# Patient Record
(demographics unavailable — no encounter records)

---

## 2024-11-11 NOTE — PROCEDURE
[FreeTextEntry1] :  ACC: [id number] EXAM: CT ANGIO CHEST AORTA [facility] ACC: [id number] EXAM: CT ANGIO ABD PELV (W)AW IC  PROCEDURE DATE: 01/24/2022    INTERPRETATION: CLINICAL INFORMATION: Atraumatic right-sided back pain, evaluate for aortic dissection  COMPARISON: Chest radiograph from earlier the same day  CONTRAST/COMPLICATIONS: IV Contrast: Omnipaque 350 120 cc administered 80 cc discarded Oral Contrast: NONE Complications: None reported at time of study completion  PROCEDURE: CT Angiography of the Chest, Abdomen and Pelvis. Gated precontrast imaging was performed through the heart followed by gated CT Angiography of the heart with subsequent non-gated arterial phase imaging of the chest, abdomen and pelvis. Sagittal and coronal reformats were performed as well as 3D (MIP) reconstructions.  FINDINGS: CHEST: LUNGS AND LARGE AIRWAYS: Patent central airways. 5 mm right lower lobe pulmonary nodule (). Emphysema. Cystic areas within the periphery of the lingula and left lower lobe may represent traction bronchiectasis (). PLEURA: Bilateral pleural calcifications. VESSELS: No intramural hematoma or aortic dissection. Atherosclerotic calcification of the aorta and coronary arteries with extensive ulcerated plaque in the aortic arch and descending thoracic aorta. Incidental note is made of a bovine arch, a normal variant. HEART: Heart size is normal. No pericardial effusion. MEDIASTINUM AND MORENO: No lymphadenopathy. CHEST WALL AND LOWER NECK: Left chest wall AICD.  ABDOMEN AND PELVIS: LIVER: Within normal limits. BILE DUCTS: Normal caliber. GALLBLADDER: Cholecystectomy. SPLEEN: Within normal limits. PANCREAS: Within normal limits. ADRENALS: Within normal limits. KIDNEYS/URETERS: Within normal limits.  BLADDER: Within normal limits. REPRODUCTIVE ORGANS: Prostate within normal limits.  BOWEL: No bowel obstruction. Appendix is normal. PERITONEUM: No ascites. VESSELS: No aortic dissection. The celiac axis, SMA, YULI and bilateral renal arteries are patent. Infrarenal abdominal aortic aneurysm measures up to 4.0 cm (11-65). There is narrowing at the origin of the celiac axis. RETROPERITONEUM/LYMPH NODES: No lymphadenopathy. ABDOMINAL WALL: Within normal limits. BONES: Degenerative changes. Osteopenia  IMPRESSION: * No thoracic intramural hematoma. * No aortic dissection. * 4.0 cm infrarenal abdominal aortic aneurysm. Emphysema. AICD Calcified pleural and diaphragmatic plaques assistant with asbestos exposure Extensive ulcerated plaque involving the aortic arch and descending thoracic aorta and upper abdominal aorta  --- End of Report ---     ARCADIO BABIN MD; Resident Radiology This document has been electronically signed. SALVADOR PAYNE MD; Attending Radiologist This document has been electronically signed. Jan 24 2022 7:07PM

## 2024-11-11 NOTE — DISCUSSION/SUMMARY
[FreeTextEntry1] : COPD:  using  Anoro at night, with benefit   history of  CAD ischemic cardiomyopathy  and HTN he has had COVID vaccine  Mitral stenosis, Severe LVH. Moderately dilated left atrium. Mild diastolic dysfunction.  CKD  CT chest 01/24//22 in hospital demonstrated RLL 5 mm nodule, also with pleural plaques consistent with asbestos pleural disease  He has had LLL nodule  history of AAA,  history of carotid artery stenosis  No infection, no CHF or angina  He is following with nephrologist, vascular and cardiologist 07/2023 he was at [facility] and found to be in atrial fibrillation   He has EST and coronary angiogram.  Non occlusive CAD noted with no stent required  He had hypotension post procedure.  Treated in ICU.    Responded to fluid.    According to daughter he has had elevate pro BNP.  He has had acute on chronic renal failure, creatinine 3.25 08/31/23  He is on Eliquis 2.5 mg BID  Off Plavix  CT chest at [facility] inpt   07/2023 Reports reviewed:  Mild cardiomegaly, emphysema, pleural plaque , focal bronchiectasis  1-2 mm subpleural nodule  RLL, diverticulosis   Current Regimen: Eliquis, metoprolol, hydralazine, atorvastatin, Lasix 20 mg daily at this time He is also on torsemide he has been hospitalized at [facility] due to dyspnea  He underwent  Antonia clip placement.  he also has significant coronary stenosis for which a stent is considered  He has increased O2 requirements and is on O2 2 lpm  He has mild pretibial edema  he has diminished breath sounds  CXR at [facility] has demonstrated congestion  Creatinine is 2.24 on 03/25/24, labs reviewed  hgb 9.3  He is breathing well. he has had no exacerbations.  Breath sounds are clear  He has had cough.  he took oral antibiotics from urgent care center likely azithromycin  he is better now, back to baseline  uses Anoro daily and albuterol MDI as needed     PLAN monitor respiratory status continue cardiac regimen including diuretic, torsemide  Monitoring renal function and electrolytes  continue Anoro  It is recommended he receives Pneumonia vaccine given influenza vaccine today   Total time spent : 30 minutes Including: Preparation prior to visit - Reviewing prior record, results of tests and Consultation Reports as applicable Conducting an appropriate H & P during today's encounter Appropriate orders for tests, medications and procedures, as applicable Counseling patient  Note completion   Juan Krishnamurthy MD

## 2024-11-11 NOTE — HISTORY OF PRESENT ILLNESS
[TextBox_4] : 85 year old male returns for follow up. He has COPD, emphysema and asbestos pleural disease. He has CAD, history of MI, AICD, EF 25%. Cath has revealed 100% RCA stenosis. He has HTN, that is now better controlled. \par  \par  He uses Anoro intermittently. He denies dyspnea. \par  \par  \par  CT chest May 28, 2019 reveal a new nodule in the LLL 1.1 cm. PET/CT was done 6/6/19 demonstrated low SUV activity suggesting benign etiology. He has not had follow up CT chest as recommended and ordered. \par  \par  Repeat CT July 12, 2020 revealed resolution of nodule. \par  \par  Echo: 10/31/2016, Mild to moderate mitral regurgitation. Mild aortic stenosis (JACKI= 1.8 sqcm). Severe LVH. Moderately dilated left atrium. Mild diastolic dysfunction. Moderate segmental left ventricular systolic dysfunction with akinesis of the basal to mid inferior and basal to mid inferolateral walls. Mild tricuspid regurgitation. PASP= 38mmHg, consistent with pulmonary hypertension. LVEF 40%. \par  Cardiac Cath: 10/1/2015: 100% proximal RCA stenosis with collaterals. \par  \par  \par  He reports that he developed hematuria and was diagnosed with bladder cancer that is being treated. \par   \par  \par  He had COVID vaccine at .\par  \par  \par  He does not want pneumonia vaccine at this time, discussed potential benefits with him.\par  \par  \par  \par  He has infrarenal AAA, carotid disease,  and  chronic renal disease\par  \par  BP better controlled\par  \par  \par  he is undergoing follow up with cardiology\par  \par  He has carotid disease, being managed\par  \par  \par  He continues to use Anoro  with albuterol as needed\par  \par  \par  He was hospitalized in 8/2022 due to CHF exacerbation\par  \par  he is currently on lasix 20 g hydralazine 20 mg BID metoprolol 75 mg\par  \par  \par  He is breathing well, but has had some cough and nasal congestion. \par  \par  His daughter brought him in because she was concerned\par  \par  He has dryness of his nose.\par  \par  He has had no fever, no pedal edema, no chest pain/pressure\par  \par   [TextBox_19] : SLEEP\par  No snoring, witnessed apnea, excessive daytime sleepiness, morning headache

## 2024-11-11 NOTE — REASON FOR VISIT
[Follow-Up] : a follow-up visit [COPD] : COPD [Shortness of Breath] : shortness of breath [TextBox_44] : CAD

## 2024-11-11 NOTE — PHYSICAL EXAM
[No Acute Distress] : no acute distress [No Deformities] : no deformities [Normal Oropharynx] : normal oropharynx [Normal Appearance] : normal appearance [No Neck Mass] : no neck mass [No Resp Distress] : no resp distress [Benign] : benign [No HSM] : no hsm [Normal Bowel Sounds] : normal bowel sounds [No Clubbing] : no clubbing [No Edema] : no edema [No Focal Deficits] : no focal deficits [Oriented x3] : oriented x3 [TextBox_68] : mild wheeze mid zone on right

## 2025-06-06 NOTE — ASSESSMENT
[FreeTextEntry1] : 1. CHF and CAD.  Discussed that the exam shows some degree of pulmonary congestion and peripheral edema. To increase the dose of Torsemide to 10 mg bid w/ an expected weight loss of about 5 pounds. The above findings explain the exertional dyspnea. 2 CKD stage IV w anemia.  To repeat labs this week to assess if renal function is further compromised. Will also do CBC to asses for anemia of CKD. 3. Type II DM, controlled. The patient is on Amiodarone. His last TSH was over 4. Will repeat thyroid function tests. Will call the patient when results are available.

## 2025-06-06 NOTE — HISTORY OF PRESENT ILLNESS
[FreeTextEntry1] : The patient is back from [place] to address his medical issues.  Mr. Meza did well until early March of this year when he developed dyspnea, palpitations. He was admitted to a Hospital and found to be in ventricular fibrillation. He was stated on high dose Amiodarone.  He underwent a cardiac catheterization and had a ballon angioplasty and stent placement.  His last creatinine in April was around 3.5.  Today he report an ulcer in his mouth which was diagnosed by an oral surgeon as herpetic and has been on Valtrex 500 mg per day w/ improvement. He states that his weight his up by 5 pounds, he has mild lower extremity edema and moderate exertional dyspnea.  He is here for the wedding of his nephew and is planning to return to [place] toward the end to the summer.  Here for follow up.
no

## 2025-06-06 NOTE — REASON FOR VISIT
[Follow-Up] : a follow-up visit [Family Member] : family member [FreeTextEntry1] : CKD IV, CAD, hypertension.

## 2025-06-06 NOTE — PHYSICAL EXAM
[General Appearance - Alert] : alert [General Appearance - In No Acute Distress] : in no acute distress [FreeTextEntry1] : Hearing aids [Jugular Venous Distention Increased] : there was no jugular-venous distention [Bibasilar Rales/Crackles] : bibasilar rales [Heart Rate And Rhythm] : heart rate was normal and rhythm regular [Heart Sounds] : normal S1 and S2 [Systolic grade ___/6] : A grade [unfilled]/6 systolic murmur was heard. [Pitting Edema] : pitting edema present [___ +] : bilateral [unfilled]+ pretibial pitting edema [Abdomen Tenderness] : non-tender [No CVA Tenderness] : no ~M costovertebral angle tenderness [Abnormal Walk] : normal gait [] : no rash [No Focal Deficits] : no focal deficits [Oriented To Time, Place, And Person] : oriented to person, place, and time

## 2025-06-11 NOTE — REASON FOR VISIT
[Follow-Up] : a follow-up visit [Shortness of Breath] : shortness of breath [COPD] : COPD [TextBox_44] : CAD

## 2025-06-11 NOTE — PHYSICAL EXAM
[No Acute Distress] : no acute distress [Normal Oropharynx] : normal oropharynx [No Deformities] : no deformities [Normal Appearance] : normal appearance [No Neck Mass] : no neck mass [No Resp Distress] : no resp distress [No HSM] : no hsm [Benign] : benign [Normal Bowel Sounds] : normal bowel sounds [No Edema] : no edema [No Clubbing] : no clubbing [No Focal Deficits] : no focal deficits [Oriented x3] : oriented x3 [TextBox_68] : mild wheeze mid zone on right

## 2025-06-11 NOTE — DISCUSSION/SUMMARY
[FreeTextEntry1] : COPD:  using  Anoro at night, with benefit   history of  CAD ischemic cardiomyopathy  and HTN he has had COVID vaccine  Mitral stenosis, Severe LVH. Moderately dilated left atrium. Mild diastolic dysfunction.  CKD  CT chest 01/24//22 in hospital demonstrated RLL 5 mm nodule, also with pleural plaques consistent with asbestos pleural disease  He has had LLL nodule  history of AAA,  history of carotid artery stenosis  No infection, no CHF or angina  He is following with nephrologist, vascular and cardiologist 07/2023 he was at [facility] and found to be in atrial fibrillation   He has EST and coronary angiogram.  Non occlusive CAD noted with no stent required  He had hypotension post procedure.  Treated in ICU.    Responded to fluid.    According to daughter he has had elevate pro BNP.  He has had acute on chronic renal failure, creatinine 3.25 08/31/23  He is on Eliquis 2.5 mg BID  Off Plavix  CT chest at [facility] inpt   07/2023 Reports reviewed:  Mild cardiomegaly, emphysema, pleural plaque , focal bronchiectasis  1-2 mm subpleural nodule  RLL, diverticulosis   Current Regimen: Eliquis, metoprolol, hydralazine, atorvastatin, Lasix 20 mg daily at this time He is also on torsemide he has been hospitalized at [facility] due to dyspnea  He underwent  Antonia clip placement.  he also has significant coronary stenosis for which a stent is considered  He has increased O2 requirements and is on O2 2 lpm  He has mild pretibial edema  he has diminished breath sounds  CXR at [facility] has demonstrated congestion  Creatinine is 2.24 on 03/25/24, labs reviewed  hgb 9.3  He is breathing well. he has had no exacerbations.  Breath sounds are clear  He has had cough.  he took oral antibiotics from urgent care center likely azithromycin  he is better now, back to baseline  uses Anoro daily and albuterol MDI as needed   On increased diuretic  due to edema  hypothyroid  lungs with diminished aeration  few basilar crackles sat 94%  has portable oxygen  PLAN  continue Oxygen as needed I will obtain lab tests at this time  monitor respiratory status continue cardiac regimen including diuretic, torsemide  Monitoring renal function and electrolytes  continue Anoro  It is recommended he receives Pneumonia vaccine    Total time spent : 30 minutes Including: Preparation prior to visit - Reviewing prior record, results of tests and Consultation Reports as applicable Conducting an appropriate H & P during today's encounter Appropriate orders for tests, medications and procedures, as applicable Counseling patient  Note completion   Juan Krishnamurthy MD

## 2025-06-11 NOTE — PROCEDURE
[FreeTextEntry1] :  ACC: [id number] EXAM: CT ANGIO CHEST AORTA [facility] ACC: [id number] EXAM: CT ANGIO ABD PELV (W)AW IC  PROCEDURE DATE: 01/24/2022    INTERPRETATION: CLINICAL INFORMATION: Atraumatic right-sided back pain, evaluate for aortic dissection  COMPARISON: Chest radiograph from earlier the same day  CONTRAST/COMPLICATIONS: IV Contrast: Omnipaque 350 120 cc administered 80 cc discarded Oral Contrast: NONE Complications: None reported at time of study completion  PROCEDURE: CT Angiography of the Chest, Abdomen and Pelvis. Gated precontrast imaging was performed through the heart followed by gated CT Angiography of the heart with subsequent non-gated arterial phase imaging of the chest, abdomen and pelvis. Sagittal and coronal reformats were performed as well as 3D (MIP) reconstructions.  FINDINGS: CHEST: LUNGS AND LARGE AIRWAYS: Patent central airways. 5 mm right lower lobe pulmonary nodule (). Emphysema. Cystic areas within the periphery of the lingula and left lower lobe may represent traction bronchiectasis (). PLEURA: Bilateral pleural calcifications. VESSELS: No intramural hematoma or aortic dissection. Atherosclerotic calcification of the aorta and coronary arteries with extensive ulcerated plaque in the aortic arch and descending thoracic aorta. Incidental note is made of a bovine arch, a normal variant. HEART: Heart size is normal. No pericardial effusion. MEDIASTINUM AND MORENO: No lymphadenopathy. CHEST WALL AND LOWER NECK: Left chest wall AICD.  ABDOMEN AND PELVIS: LIVER: Within normal limits. BILE DUCTS: Normal caliber. GALLBLADDER: Cholecystectomy. SPLEEN: Within normal limits. PANCREAS: Within normal limits. ADRENALS: Within normal limits. KIDNEYS/URETERS: Within normal limits.  BLADDER: Within normal limits. REPRODUCTIVE ORGANS: Prostate within normal limits.  BOWEL: No bowel obstruction. Appendix is normal. PERITONEUM: No ascites. VESSELS: No aortic dissection. The celiac axis, SMA, YULI and bilateral renal arteries are patent. Infrarenal abdominal aortic aneurysm measures up to 4.0 cm (11-65). There is narrowing at the origin of the celiac axis. RETROPERITONEUM/LYMPH NODES: No lymphadenopathy. ABDOMINAL WALL: Within normal limits. BONES: Degenerative changes. Osteopenia  IMPRESSION: * No thoracic intramural hematoma. * No aortic dissection. * 4.0 cm infrarenal abdominal aortic aneurysm. Emphysema. AICD Calcified pleural and diaphragmatic plaques assistant with asbestos exposure Extensive ulcerated plaque involving the aortic arch and descending thoracic aorta and upper abdominal aorta  --- End of Report ---     ARCADIO BABIN MD; Resident Radiology This document has been electronically signed. SALVADOR PAYNE MD; Attending Radiologist This document has been electronically signed. Jan 24 2022 7:07PM

## 2025-07-21 NOTE — ASSESSMENT
[FreeTextEntry1] : 1. PAD - asymptomatic 2. Carotid artery stenosis asymptomatic 3. CAD 4. Former smoker 5. 4.0 m AAA   Plan: 1. Repeat US aorta today with 4.5cm AAA, repeat in 1 year    2.  Carotid duplex ordered for surveillance  3.  Blood pressure well controlled 4.   Continue statin therapy  5.   Continue antiplatet meds 6.  Continue BB 7.  RTC in 1 year

## 2025-07-21 NOTE — PHYSICAL EXAM
[General Appearance - Well Developed] : well developed [Normal Appearance] : normal appearance [Well Groomed] : well groomed [General Appearance - Well Nourished] : well nourished [No Deformities] : no deformities [General Appearance - In No Acute Distress] : no acute distress [Normal Conjunctiva] : the conjunctiva exhibited no abnormalities [Eyelids - No Xanthelasma] : the eyelids demonstrated no xanthelasmas [Normal Oral Mucosa] : normal oral mucosa [No Oral Pallor] : no oral pallor [No Oral Cyanosis] : no oral cyanosis [Normal Jugular Venous A Waves Present] : normal jugular venous A waves present [Normal Jugular Venous V Waves Present] : normal jugular venous V waves present [No Jugular Venous Bird A Waves] : no jugular venous bird A waves [Respiration, Rhythm And Depth] : normal respiratory rhythm and effort [Exaggerated Use Of Accessory Muscles For Inspiration] : no accessory muscle use [Auscultation Breath Sounds / Voice Sounds] : lungs were clear to auscultation bilaterally [Heart Rate And Rhythm] : heart rate and rhythm were normal [Heart Sounds] : normal S1 and S2 [Murmurs] : no murmurs present [Abdomen Soft] : soft [Abdomen Tenderness] : non-tender [Abdomen Mass (___ Cm)] : no abdominal mass palpated [Abnormal Walk] : normal gait [Gait - Sufficient For Exercise Testing] : the gait was sufficient for exercise testing [Nail Clubbing] : no clubbing of the fingernails [Cyanosis, Localized] : no localized cyanosis [Petechial Hemorrhages (___cm)] : no petechial hemorrhages [] : no ischemic changes [FreeTextEntry1] : BILATERAL CAROTID BRUITS>

## 2025-07-21 NOTE — REASON FOR VISIT
[Follow-Up - Clinic] : a clinic follow-up of [FreeTextEntry2] : PAD [FreeTextEntry1] : 7/21/2025  Seen by Dr. Perkins and Dr. Sales for carotid dz medical management suggested Feeling well traveling to and from [place] no stroke no abdominal pains  US aorta with 4.5 cm AAA today     7/15/2024   Doing well no leg swelling no CP  Breathing ok, uses inhaler   US abdominal aorta 5/2023: 1. There is a 4.3cm AAA noted in the distal abdominal aorta. 2. There has been a slight increase in the size of the AAA when compared to 2021 (4.1cm)  Carotid artery duplex  5/2023: 1. Moderate calcified plaque in the right carotid bulb 2. Right ICA mild - moderate plaque, 20-49% stenosis. 3. Moderate calcified plaque in the left carotid bulb. 4. Left ICA with velocity of 252/33 consistent with a greater than 70% stenosis.  10/9/2023 Had admission to [facility] with mitral clip Here with daughter and wife Was told to followup with [facility] cardiology on exam he is volume overloaded, on oxygen, BNP was 10K   5/22/2023  Creatinine 1.85 Had carotid duplex with uptrend in velocities with left /33 Compared to May 2022 there is a slight worsening  the right ICA 20-49% US Aorta:  4.3 cm  No CVA, no Amourosis, no TIA Duaghter with him   Otherwise feels good   11/21/2022 Doing ok  4 cm AAA known moderate carotid dz no CVA, Stroke, Amaurosis feels ok plans to travel to [place] for the winter in good spirits home BP is 120 here on manual check it is 130/60   83M   He is limited in his walking due to his breathing He has no claudication Daughter joined by azraime He denies leg pains with walking  The arterial dupx was done due to leg pains.  This resolved.    If he lifts up his leg he has pains it is positiona  No foot wounds or ischemica.